# Patient Record
Sex: MALE | URBAN - NONMETROPOLITAN AREA
[De-identification: names, ages, dates, MRNs, and addresses within clinical notes are randomized per-mention and may not be internally consistent; named-entity substitution may affect disease eponyms.]

---

## 2022-05-13 ENCOUNTER — APPOINTMENT (OUTPATIENT)
Age: 67
Setting detail: DERMATOLOGY
End: 2022-05-13

## 2022-05-13 DIAGNOSIS — L21.8 OTHER SEBORRHEIC DERMATITIS: ICD-10-CM

## 2022-05-13 DIAGNOSIS — L29.8 OTHER PRURITUS: ICD-10-CM

## 2022-05-13 PROCEDURE — OTHER REASSURANCE: OTHER

## 2022-05-13 PROCEDURE — OTHER COUNSELING: OTHER

## 2022-05-13 PROCEDURE — OTHER PRESCRIPTION: OTHER

## 2022-05-13 PROCEDURE — OTHER MIPS QUALITY: OTHER

## 2022-05-13 PROCEDURE — OTHER ADDITIONAL NOTES: OTHER

## 2022-05-13 PROCEDURE — 99203 OFFICE O/P NEW LOW 30 MIN: CPT

## 2022-05-13 PROCEDURE — OTHER PHOTO-DOCUMENTATION: OTHER

## 2022-05-13 RX ORDER — HYDROXYZINE HYDROCHLORIDE 25 MG/1
25 MG TABLET, FILM COATED ORAL
Qty: 30 | Refills: 5 | Status: ERX | COMMUNITY
Start: 2022-05-13

## 2022-05-13 RX ORDER — PREDNISONE 20 MG/1
20 MG TABLET ORAL AS DIRECTED
Qty: 25 | Refills: 0 | Status: ERX | COMMUNITY
Start: 2022-05-13

## 2022-05-13 RX ORDER — TRIAMCINOLONE ACETONIDE 1 MG/G
0.1% CREAM TOPICAL BID
Qty: 454 | Refills: 2 | Status: ERX

## 2022-05-13 RX ORDER — KETOCONAZOLE 20 MG/ML
2% SHAMPOO, SUSPENSION TOPICAL QD
Qty: 120 | Refills: 2 | Status: ERX | COMMUNITY
Start: 2022-05-13

## 2022-05-13 ASSESSMENT — LOCATION ZONE DERM
LOCATION ZONE: ARM
LOCATION ZONE: NECK
LOCATION ZONE: LEG
LOCATION ZONE: TRUNK

## 2022-05-13 ASSESSMENT — LOCATION DETAILED DESCRIPTION DERM
LOCATION DETAILED: LEFT SUPERIOR POSTERIOR NECK
LOCATION DETAILED: LEFT SUPERIOR UPPER BACK
LOCATION DETAILED: LEFT POSTERIOR NECK
LOCATION DETAILED: LEFT POPLITEAL SKIN
LOCATION DETAILED: RIGHT LATERAL TRAPEZIAL NECK
LOCATION DETAILED: LEFT LATERAL TRAPEZIAL NECK
LOCATION DETAILED: RIGHT DISTAL DORSAL FOREARM
LOCATION DETAILED: RIGHT SUPERIOR POSTERIOR NECK

## 2022-05-13 ASSESSMENT — LOCATION SIMPLE DESCRIPTION DERM
LOCATION SIMPLE: POSTERIOR NECK
LOCATION SIMPLE: LEFT POPLITEAL SKIN
LOCATION SIMPLE: LEFT UPPER BACK
LOCATION SIMPLE: RIGHT FOREARM

## 2022-05-13 NOTE — PROCEDURE: MIPS QUALITY
Quality 110: Preventive Care And Screening: Influenza Immunization: Influenza Immunization previously received during influenza season
Quality 111:Pneumonia Vaccination Status For Older Adults: Pneumococcal Vaccination Administered
Quality 130: Documentation Of Current Medications In The Medical Record: Current Medications Documented
Quality 226: Preventive Care And Screening: Tobacco Use: Screening And Cessation Intervention: Patient screened for tobacco use and is an ex/non-smoker
Detail Level: Detailed
Quality 47: Advance Care Plan: Advance Care Planning discussed and documented; advance care plan or surrogate decision maker documented in the medical record.

## 2022-05-13 NOTE — PROCEDURE: ADDITIONAL NOTES
Additional Notes: Biopsy from previous provider will be obtain via medical release.
Detail Level: Simple
Render Risk Assessment In Note?: no

## 2022-06-06 ENCOUNTER — RX ONLY (RX ONLY)
Age: 67
End: 2022-06-06

## 2022-06-06 ENCOUNTER — APPOINTMENT (OUTPATIENT)
Age: 67
Setting detail: DERMATOLOGY
End: 2022-06-14

## 2022-06-06 DIAGNOSIS — L81.4 OTHER MELANIN HYPERPIGMENTATION: ICD-10-CM

## 2022-06-06 DIAGNOSIS — L259 CONTACT DERMATITIS AND OTHER ECZEMA, UNSPECIFIED CAUSE: ICD-10-CM

## 2022-06-06 PROBLEM — L30.9 DERMATITIS, UNSPECIFIED: Status: ACTIVE | Noted: 2022-06-06

## 2022-06-06 PROCEDURE — OTHER COUNSELING: OTHER

## 2022-06-06 PROCEDURE — OTHER REASSURANCE: OTHER

## 2022-06-06 PROCEDURE — 11105 PUNCH BX SKIN EA SEP/ADDL: CPT

## 2022-06-06 PROCEDURE — 11104 PUNCH BX SKIN SINGLE LESION: CPT

## 2022-06-06 PROCEDURE — 99212 OFFICE O/P EST SF 10 MIN: CPT | Mod: 25

## 2022-06-06 PROCEDURE — OTHER BIOPSY BY PUNCH METHOD: OTHER

## 2022-06-06 RX ORDER — HYDROXYZINE HYDROCHLORIDE 25 MG/1
25 MG TABLET, FILM COATED ORAL BID
Qty: 60 | Refills: 5 | Status: ERX

## 2022-06-06 ASSESSMENT — LOCATION ZONE DERM
LOCATION ZONE: ARM
LOCATION ZONE: FACE
LOCATION ZONE: LEG

## 2022-06-06 ASSESSMENT — LOCATION SIMPLE DESCRIPTION DERM
LOCATION SIMPLE: LEFT THIGH
LOCATION SIMPLE: LEFT UPPER ARM
LOCATION SIMPLE: LEFT FOREHEAD
LOCATION SIMPLE: LEFT CHEEK
LOCATION SIMPLE: RIGHT CHEEK

## 2022-06-06 ASSESSMENT — LOCATION DETAILED DESCRIPTION DERM
LOCATION DETAILED: LEFT CENTRAL MALAR CHEEK
LOCATION DETAILED: RIGHT CENTRAL MALAR CHEEK
LOCATION DETAILED: LEFT ANTERIOR PROXIMAL UPPER ARM
LOCATION DETAILED: LEFT FOREHEAD
LOCATION DETAILED: LEFT ANTERIOR PROXIMAL THIGH

## 2022-06-06 NOTE — PROCEDURE: BIOPSY BY PUNCH METHOD
Render In Bullet Format When Appropriate: No
Notification Instructions: Patient will be notified of biopsy results. However, patient instructed to call the office if not contacted within 2 weeks.
Dressing: bandage
Size Of Lesion In Cm (Optional): 0
Biopsy Type: H and E
Epidermal Sutures: 4-0 Ethilon
Was A Bandage Applied: Yes
Punch Size In Mm: 3
Detail Level: Detailed
Wound Care: Petrolatum
Consent: Written consent was obtained and risks were reviewed including but not limited to scarring, infection, bleeding, scabbing, incomplete removal, nerve damage and allergy to anesthesia.
Hemostasis: None
Home Suture Removal Text: Patient was provided a home suture removal kit and will remove their sutures at home.  If they have any questions or difficulties they will call the office.
Information: Selecting Yes will display possible errors in your note based on the variables you have selected. This validation is only offered as a suggestion for you. PLEASE NOTE THAT THE VALIDATION TEXT WILL BE REMOVED WHEN YOU FINALIZE YOUR NOTE. IF YOU WANT TO FAX A PRELIMINARY NOTE YOU WILL NEED TO TOGGLE THIS TO 'NO' IF YOU DO NOT WANT IT IN YOUR FAXED NOTE.
Anesthesia Type: 1% lidocaine with epinephrine
Post-Care Instructions: I reviewed with the patient in detail post-care instructions. Patient is to keep the biopsy site dry overnight, and then apply bacitracin twice daily until healed. Patient may apply hydrogen peroxide soaks to remove any crusting.
Suture Removal: 14 days
Anesthesia Volume In Cc (Will Not Render If 0): 0.5
Billing Type: Third-Party Bill

## 2022-06-20 ENCOUNTER — APPOINTMENT (OUTPATIENT)
Age: 67
Setting detail: DERMATOLOGY
End: 2022-06-20

## 2022-06-20 DIAGNOSIS — Z48.02 ENCOUNTER FOR REMOVAL OF SUTURES: ICD-10-CM

## 2022-06-20 PROCEDURE — OTHER ADDITIONAL NOTES: OTHER

## 2022-06-20 PROCEDURE — OTHER PATHOLOGY DISCUSSION: OTHER

## 2022-06-20 PROCEDURE — 99024 POSTOP FOLLOW-UP VISIT: CPT

## 2022-06-20 PROCEDURE — OTHER SUTURE REMOVAL (NO GLOBAL PERIOD): OTHER

## 2022-06-20 ASSESSMENT — LOCATION SIMPLE DESCRIPTION DERM
LOCATION SIMPLE: LEFT THIGH
LOCATION SIMPLE: LEFT UPPER ARM

## 2022-06-20 ASSESSMENT — LOCATION ZONE DERM
LOCATION ZONE: LEG
LOCATION ZONE: ARM

## 2022-06-20 ASSESSMENT — LOCATION DETAILED DESCRIPTION DERM
LOCATION DETAILED: LEFT ANTERIOR PROXIMAL THIGH
LOCATION DETAILED: LEFT ANTERIOR PROXIMAL UPPER ARM

## 2022-06-20 NOTE — PROCEDURE: ADDITIONAL NOTES
Additional Notes: Pt states when he was down to the last prednisone pill it started to come back so pt used the wash given to him with no improvement either so he went to the [facility] and was anointed in oil and laid hands on him with prayer and was completely cleared the next day
Detail Level: Simple
Render Risk Assessment In Note?: no

## 2022-06-20 NOTE — PROCEDURE: PATHOLOGY DISCUSSION
Detail Level: Zone
Add 74420 Cpt? (Important Note: In 2017 The Use Of 01911 Is Being Tracked By Cms To Determine Future Global Period Reimbursement For Global Periods): yes